# Patient Record
Sex: MALE | Race: WHITE | NOT HISPANIC OR LATINO | URBAN - METROPOLITAN AREA
[De-identification: names, ages, dates, MRNs, and addresses within clinical notes are randomized per-mention and may not be internally consistent; named-entity substitution may affect disease eponyms.]

---

## 2017-05-16 ENCOUNTER — EMERGENCY (EMERGENCY)
Facility: HOSPITAL | Age: 62
LOS: 1 days | Discharge: HOME | End: 2017-05-16
Attending: EMERGENCY MEDICINE | Admitting: EMERGENCY MEDICINE
Payer: MEDICARE

## 2017-05-16 VITALS — HEIGHT: 73.5 IN | WEIGHT: 172.18 LBS

## 2017-05-16 VITALS
RESPIRATION RATE: 18 BRPM | HEART RATE: 73 BPM | SYSTOLIC BLOOD PRESSURE: 149 MMHG | TEMPERATURE: 98 F | OXYGEN SATURATION: 97 % | DIASTOLIC BLOOD PRESSURE: 81 MMHG

## 2017-05-16 DIAGNOSIS — R45.851 SUICIDAL IDEATIONS: ICD-10-CM

## 2017-05-16 DIAGNOSIS — Z86.73 PERSONAL HISTORY OF TRANSIENT ISCHEMIC ATTACK (TIA), AND CEREBRAL INFARCTION WITHOUT RESIDUAL DEFICITS: ICD-10-CM

## 2017-05-16 DIAGNOSIS — K21.9 GASTRO-ESOPHAGEAL REFLUX DISEASE WITHOUT ESOPHAGITIS: ICD-10-CM

## 2017-05-16 DIAGNOSIS — Z88.8 ALLERGY STATUS TO OTHER DRUGS, MEDICAMENTS AND BIOLOGICAL SUBSTANCES: ICD-10-CM

## 2017-05-16 DIAGNOSIS — Z88.2 ALLERGY STATUS TO SULFONAMIDES: ICD-10-CM

## 2017-05-16 DIAGNOSIS — Z88.5 ALLERGY STATUS TO NARCOTIC AGENT: ICD-10-CM

## 2017-05-16 DIAGNOSIS — F41.9 ANXIETY DISORDER, UNSPECIFIED: ICD-10-CM

## 2017-05-16 DIAGNOSIS — F32.9 MAJOR DEPRESSIVE DISORDER, SINGLE EPISODE, UNSPECIFIED: ICD-10-CM

## 2017-05-16 DIAGNOSIS — F17.200 NICOTINE DEPENDENCE, UNSPECIFIED, UNCOMPLICATED: ICD-10-CM

## 2017-05-16 DIAGNOSIS — I25.10 ATHEROSCLEROTIC HEART DISEASE OF NATIVE CORONARY ARTERY WITHOUT ANGINA PECTORIS: ICD-10-CM

## 2017-05-16 DIAGNOSIS — J44.9 CHRONIC OBSTRUCTIVE PULMONARY DISEASE, UNSPECIFIED: ICD-10-CM

## 2017-05-16 LAB
ALBUMIN SERPL ELPH-MCNC: 4.2 G/DL — SIGNIFICANT CHANGE UP (ref 3.3–5)
ALP SERPL-CCNC: 65 U/L — SIGNIFICANT CHANGE UP (ref 40–120)
ALT FLD-CCNC: 15 U/L RC — SIGNIFICANT CHANGE UP (ref 10–45)
ANION GAP SERPL CALC-SCNC: 16 MMOL/L — SIGNIFICANT CHANGE UP (ref 5–17)
AST SERPL-CCNC: 21 U/L — SIGNIFICANT CHANGE UP (ref 10–40)
BASOPHILS # BLD AUTO: 0 K/UL — SIGNIFICANT CHANGE UP (ref 0–0.2)
BASOPHILS NFR BLD AUTO: 0.4 % — SIGNIFICANT CHANGE UP (ref 0–2)
BILIRUB SERPL-MCNC: 0.7 MG/DL — SIGNIFICANT CHANGE UP (ref 0.2–1.2)
BUN SERPL-MCNC: 15 MG/DL — SIGNIFICANT CHANGE UP (ref 7–23)
CALCIUM SERPL-MCNC: 9.3 MG/DL — SIGNIFICANT CHANGE UP (ref 8.4–10.5)
CHLORIDE SERPL-SCNC: 100 MMOL/L — SIGNIFICANT CHANGE UP (ref 96–108)
CO2 SERPL-SCNC: 25 MMOL/L — SIGNIFICANT CHANGE UP (ref 22–31)
CREAT SERPL-MCNC: 1.03 MG/DL — SIGNIFICANT CHANGE UP (ref 0.5–1.3)
EOSINOPHIL # BLD AUTO: 0.1 K/UL — SIGNIFICANT CHANGE UP (ref 0–0.5)
EOSINOPHIL NFR BLD AUTO: 0.6 % — SIGNIFICANT CHANGE UP (ref 0–6)
ETHANOL SERPL-MCNC: SIGNIFICANT CHANGE UP MG/DL (ref 0–10)
GLUCOSE SERPL-MCNC: 117 MG/DL — HIGH (ref 70–99)
HCT VFR BLD CALC: 44.5 % — SIGNIFICANT CHANGE UP (ref 39–50)
HGB BLD-MCNC: 14.9 G/DL — SIGNIFICANT CHANGE UP (ref 13–17)
LYMPHOCYTES # BLD AUTO: 2.3 K/UL — SIGNIFICANT CHANGE UP (ref 1–3.3)
LYMPHOCYTES # BLD AUTO: 21.2 % — SIGNIFICANT CHANGE UP (ref 13–44)
MCHC RBC-ENTMCNC: 32.9 PG — SIGNIFICANT CHANGE UP (ref 27–34)
MCHC RBC-ENTMCNC: 33.5 GM/DL — SIGNIFICANT CHANGE UP (ref 32–36)
MCV RBC AUTO: 98.2 FL — SIGNIFICANT CHANGE UP (ref 80–100)
MONOCYTES # BLD AUTO: 0.4 K/UL — SIGNIFICANT CHANGE UP (ref 0–0.9)
MONOCYTES NFR BLD AUTO: 3.6 % — SIGNIFICANT CHANGE UP (ref 2–14)
NEUTROPHILS # BLD AUTO: 8.1 K/UL — HIGH (ref 1.8–7.4)
NEUTROPHILS NFR BLD AUTO: 74.2 % — SIGNIFICANT CHANGE UP (ref 43–77)
PCP SPEC-MCNC: SIGNIFICANT CHANGE UP
PLATELET # BLD AUTO: 303 K/UL — SIGNIFICANT CHANGE UP (ref 150–400)
POTASSIUM SERPL-MCNC: 4.5 MMOL/L — SIGNIFICANT CHANGE UP (ref 3.5–5.3)
POTASSIUM SERPL-SCNC: 4.5 MMOL/L — SIGNIFICANT CHANGE UP (ref 3.5–5.3)
PROT SERPL-MCNC: 7.7 G/DL — SIGNIFICANT CHANGE UP (ref 6–8.3)
RBC # BLD: 4.53 M/UL — SIGNIFICANT CHANGE UP (ref 4.2–5.8)
RBC # FLD: 12.3 % — SIGNIFICANT CHANGE UP (ref 10.3–14.5)
SODIUM SERPL-SCNC: 141 MMOL/L — SIGNIFICANT CHANGE UP (ref 135–145)
TSH SERPL-MCNC: 0.74 UIU/ML — SIGNIFICANT CHANGE UP (ref 0.27–4.2)
WBC # BLD: 10.9 K/UL — HIGH (ref 3.8–10.5)
WBC # FLD AUTO: 10.9 K/UL — HIGH (ref 3.8–10.5)

## 2017-05-16 PROCEDURE — 84443 ASSAY THYROID STIM HORMONE: CPT

## 2017-05-16 PROCEDURE — 85027 COMPLETE CBC AUTOMATED: CPT

## 2017-05-16 PROCEDURE — 80053 COMPREHEN METABOLIC PANEL: CPT

## 2017-05-16 PROCEDURE — 90792 PSYCH DIAG EVAL W/MED SRVCS: CPT | Mod: GC

## 2017-05-16 PROCEDURE — 99284 EMERGENCY DEPT VISIT MOD MDM: CPT | Mod: GC

## 2017-05-16 PROCEDURE — 80307 DRUG TEST PRSMV CHEM ANLYZR: CPT

## 2017-05-16 PROCEDURE — 99285 EMERGENCY DEPT VISIT HI MDM: CPT

## 2017-05-16 NOTE — ED PROVIDER NOTE - OBJECTIVE STATEMENT
61M current smoker hx COPD, CAD, CVA, anxiety BIBEMS for SI.  As per EMS, patient reported to internist Dr. Yayo Lopez that he wanted to kill himself.  Expressed an active plan to shoot himself with a licensed weapon he owns and keeps at home. 61M current smoker hx COPD, CAD, CVA, anxiety, chronic pain following with pain management ([facility]) BIBEMS for SI.  As per EMS, patient reported to internist Dr. Yayo Lopez that he wanted to kill himself.  Expressed an active plan to shoot himself with a licensed weapon he owns and keeps at home.  Had previously been prescribed cymbalta, wellbutrin with adverse reactions.  Just self-d/c'd depakote.  Currently taking lexapro just recently increased and neurontin.  Valium and hydrocodone-acetaminophen for pain. 61M current smoker hx COPD, CAD, CVA, anxiety, chronic pain following with pain management ([facility] Sandoval) BIBEMS for SI.  As per EMS, patient reported to internist Dr. Yayo Lopez that he wanted to kill himself.  Expressed an active plan to shoot himself with a licensed weapon he owns and keeps at home.  Had previously been prescribed cymbalta, wellbutrin with adverse reactions.  Just self-d/c'd depakote.  Currently taking lexapro just recently increased and neurontin.  Valium and hydrocodone-acetaminophen for pain.    Collateral information from patient's wife who presented to ED shortly after patient presentation.

## 2017-05-16 NOTE — ED PROVIDER NOTE - ATTENDING CONTRIBUTION TO CARE
Dr. Irene (Attending Physician)  I performed a history and physical exam of the patient and discussed their management with the resident. I reviewed the resident's note and agree with the documented findings and plan of care. My medical decision making and observations are found above.

## 2017-05-16 NOTE — ED BEHAVIORAL HEALTH NOTE - BEHAVIORAL HEALTH NOTE
Pt refused EKG despite encouragement and education, Pt stated he's had enough EKGs and has been normal. MD made aware

## 2017-05-16 NOTE — ED PROVIDER NOTE - PMH
Anxiety    COPD (chronic obstructive pulmonary disease)    Coronary artery disease    CVA (cerebral vascular accident)    GERD (gastroesophageal reflux disease)

## 2017-05-16 NOTE — ED ADULT NURSE NOTE - OBJECTIVE STATEMENT
60 y/o male BIB EMS.Pt went to his internist today asking for pain medications. During the visit pt verbalized that he feels suicidal and had a plan to kill himself with a  gun that he owns at home, doctors office in turn called 911..Pt appeared anxious and persisitent, oppositional, needing much encouragement  to comply with some instructions.  Pt also c/o pain after having three surgeries, pt last surgery was in december and now pt c/o "constant pain". Pt uses cane and states that he can lose his balance at anytime. Upon GF's arrival to the hospital, pt immediately told her not disclose any information and told her if she does, he witl stop speaking to her.. GF took all of patients properties, pt was placed on 1:1 for safety 62 y/o male BIB EMS.Pt went to his internist today asking for pain medications. During the visit pt verbalized that he feels suicidal and had a plan to kill himself with a  gun that he owns at home, doctors office in turn called 911..Pt appeared anxious and persistent, oppositional, needing much encouragement  to comply with some instructions.  Pt also c/o pain after having three surgeries due to hernia, pt last surgery was in december and now pt c/o "constant pain". Pt had a stroke wich was just confirmed  about three weeks ago. Pt uses cane and states that he can lose his balance at anytime.. GF took all of patients properties, pt was placed on 1:1 for safety 60 y/o male BIB EMS.Pt went to his internist today asking for pain medications. During the visit pt verbalized that he feels suicidal and had a plan to kill himself with a gun that he owns at home, doctors office in turn called 911, upon interview with patient, pt denied same and said he was misunderstood by the doctor. ..Pt appeared anxious and persistent, oppositional, needing much encouragement  to comply with some instructions.  Pt also c/o pain after having three surgeries due to hernia, Pt last surgery was in december and now pt c/o "constant pain". Pt had a stroke wich was just confirmed  about three weeks ago. Pt uses cane and states that he can lose his balance at anytime.. GF took all of patients properties, pt was placed on 1:1 for safety

## 2017-05-16 NOTE — ED BEHAVIORAL HEALTH ASSESSMENT NOTE - SUMMARY
62 y/o male with anxiety and depression secondary to medical issues sent by PMD for evaluation of suicidality. Patient is calm, cooperative, alert and orientedx4. Denies any acute depressive symptoms and denies any suicidal ideation, intent or plan. No homicidality or hallucinations.  Patient does not meet criteria for inpatient psychiatric admission.  Interested in psych follow-up at [facility], info provided.  Discussed with Dr. Swift 62 y/o male with anxiety and depression secondary to medical issues sent by PMD for evaluation of suicidality. Patient is calm, cooperative, alert and orientedx4. Denies any acute depressive symptoms and denies any suicidal ideation, intent or plan. No homicidality or hallucinations.  Patient does not meet criteria for inpatient psychiatric admission.  Interested in psych follow-up at [facility], info provided.  Discussed and seen with Dr. Swift

## 2017-05-16 NOTE — ED BEHAVIORAL HEALTH ASSESSMENT NOTE - CASE SUMMARY
This is a 61-y.o. male patient with anxiety and depression due to chronic medical issues sent by PMD for evaluation of suicidality. Patient endorses some anxiety and some mood sx, but adamantly denies hopelessness, helplessness, SI/HI. Girlfriend confirms patient is not and has not been suicidal. He is likely not an imminent threat to self or others at this time and can be discharged at this time to follow up with an outpatient provider.

## 2017-05-16 NOTE — ED BEHAVIORAL HEALTH ASSESSMENT NOTE - SUICIDE PROTECTIVE FACTORS
Fear of death or dying due to pain/suffering/Ability to cope with stress/Positive therapeutic relationships/Supportive social network or family/Identifies reasons for living/Future oriented/Responsibility to family and others

## 2017-05-16 NOTE — ED PROVIDER NOTE - CARE PLAN
Principal Discharge DX:	Suicidal ideation Principal Discharge DX:	Suicidal ideation  Instructions for follow-up, activity and diet:	Please follow-up with psychiatry as instructed.  Take home medications as prescribed by your primary doctor.  Return for any new/worsening symptoms or concerns.

## 2017-05-16 NOTE — ED BEHAVIORAL HEALTH ASSESSMENT NOTE - RISK ASSESSMENT
Patient is a white male, has medical co-morbidities and chronic pain, on psych meds prescribed by PMD, He has access to firearm. He has some reactive depression and anxiety in context of ongoing medical issues, pending surgeries and chronic pain.   He has no psychiatric illness prior to 2 yrs when his medical issues started. He has no h/o suicidal ideation, intent, plan or attempt. He lives at home with girlfriend for 3 yrs, firearm not at home. Girlfriend has no safety concerns, he has never expressed any suicidality to her. He is future oriented and wishes to follow-up with a psychiatrist.

## 2017-05-16 NOTE — ED BEHAVIORAL HEALTH ASSESSMENT NOTE - HPI (INCLUDE ILLNESS QUALITY, SEVERITY, DURATION, TIMING, CONTEXT, MODIFYING FACTORS, ASSOCIATED SIGNS AND SYMPTOMS)
60 y/o male with multiple medical co-morbidities brought in after he was referred by PMD for reportedly expressing suicidality. It was documented in ER note that he expressed a desire to shoot himself with a gun.  Patient reports he was seeing his PMD and told him he is frustrated with all his medical issues and that "this is not living". He said he was just venting and at no point made any suicidal statements or said anything about shooting himself. He does own a gun which he keeps locked.   Patient reports ongoing anxiety and depressed mood about his medical issues due to multiple hernia surgeries, COPD, Cardiac problems and a recent stroke which has affected his short term memory. But denies any hopelessness, helplessness, guilt, worthlessness or suicidal thoughts. Denies any intent or plan to hurt himself.

## 2017-05-16 NOTE — ED BEHAVIORAL HEALTH ASSESSMENT NOTE - DESCRIPTION
Patient has no prior psychiatric history. He started becoming anxious after first having hernia surgery 2 years ago. He was in a lot of pain and needed more surgeries. Since then, he has been on various psychiatric meds prescribed by PMD. Currently on Lexapro 10 mg and Valium 10 mg, thinks it's working ok. He sleeps inconsistently but it is due to pain, he cannot lie flat and has to sleep on recliner.  No h/o any psychiatric issues or treatment in the past. No h/o janet or psychosis.  No h/o any suicidality or psych hospitalizations.   Smokes a pack and half of cigarettes a day. No alcohol or any other drugs use.  H/O CVA, COPD, hernia  No family h/o mental illness.  Patient used to work as a , went on disability 2 years ago due to surgeries and pain. Lives at home with girlfriend.   Girlfriend Estela in ED reports that she has known him for 40 yrs and living with him for 3 yrs. He has been upset and frustrated due to medical issues, which she believes are not being addressed properly by PMD, also he has had side-effects to some medications. So he becomes irritable but at no point has expressed any suicidal thoughts. He always takes care of his medical issues, goes to appointments and takes medications. No h/o any suicidality in the past. CVA, COPD, Hernia see above

## 2017-05-16 NOTE — ED PROVIDER NOTE - MEDICAL DECISION MAKING DETAILS
Dr. Irene (Attending Physician)  Suicidal ideation with plan to shoot himself with gun.  On valium and opioids at home but Denies recent drug or etoh abuse. Physical exam unremarkable. Normal gait. Heart and lungs clear. No concerns for active intoxication or medical cause of suicidal ideation. Will check labs and consult psych. medically cleared.

## 2017-05-16 NOTE — ED PROVIDER NOTE - PLAN OF CARE
Please follow-up with psychiatry as instructed.  Take home medications as prescribed by your primary doctor.  Return for any new/worsening symptoms or concerns.

## 2017-11-03 ENCOUNTER — EMERGENCY (EMERGENCY)
Facility: HOSPITAL | Age: 62
LOS: 0 days | Discharge: HOME | End: 2017-11-04
Attending: EMERGENCY MEDICINE
Payer: MEDICARE

## 2017-11-03 VITALS
HEART RATE: 74 BPM | RESPIRATION RATE: 18 BRPM | DIASTOLIC BLOOD PRESSURE: 54 MMHG | TEMPERATURE: 98 F | HEIGHT: 73 IN | OXYGEN SATURATION: 92 % | WEIGHT: 171.96 LBS | SYSTOLIC BLOOD PRESSURE: 136 MMHG

## 2017-11-03 PROCEDURE — 99284 EMERGENCY DEPT VISIT MOD MDM: CPT

## 2017-11-03 PROCEDURE — 73130 X-RAY EXAM OF HAND: CPT | Mod: 26,LT

## 2017-11-03 PROCEDURE — 73610 X-RAY EXAM OF ANKLE: CPT | Mod: 26,RT

## 2017-11-03 RX ORDER — IBUPROFEN 200 MG
600 TABLET ORAL ONCE
Qty: 0 | Refills: 0 | Status: DISCONTINUED | OUTPATIENT
Start: 2017-11-03 | End: 2017-11-04
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
## 2017-11-03 NOTE — ED ADULT NURSE NOTE - PMH
Anxiety    Chronic back pain    COPD (chronic obstructive pulmonary disease)    Coronary artery disease    CVA (cerebral vascular accident)    GERD (gastroesophageal reflux disease)
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
## 2017-11-04 VITALS
OXYGEN SATURATION: 94 % | RESPIRATION RATE: 16 BRPM | DIASTOLIC BLOOD PRESSURE: 56 MMHG | HEART RATE: 60 BPM | TEMPERATURE: 98 F | SYSTOLIC BLOOD PRESSURE: 124 MMHG

## 2017-11-04 PROCEDURE — 72125 CT NECK SPINE W/O DYE: CPT | Mod: 26

## 2017-11-04 PROCEDURE — 70450 CT HEAD/BRAIN W/O DYE: CPT | Mod: 26

## 2017-11-04 PROCEDURE — 71250 CT THORAX DX C-: CPT | Mod: 26

## 2017-11-04 RX ORDER — MORPHINE SULFATE 50 MG/1
2 CAPSULE, EXTENDED RELEASE ORAL ONCE
Qty: 0 | Refills: 0 | Status: DISCONTINUED | OUTPATIENT
Start: 2017-11-04 | End: 2017-11-04

## 2017-11-04 RX ORDER — ACETAMINOPHEN 500 MG
975 TABLET ORAL ONCE
Qty: 0 | Refills: 0 | Status: COMPLETED | OUTPATIENT
Start: 2017-11-04 | End: 2017-11-04

## 2017-11-04 RX ADMIN — Medication 2 MILLIGRAM(S): at 01:30

## 2017-11-04 NOTE — ED ADULT NURSE REASSESSMENT NOTE - NS ED NURSE REASSESS COMMENT FT1
Patient asleep at this time, family at bedside, no signs of distress noted. Will continue to monitor

## 2017-11-06 DIAGNOSIS — F41.9 ANXIETY DISORDER, UNSPECIFIED: ICD-10-CM

## 2017-11-06 DIAGNOSIS — M79.1 MYALGIA: ICD-10-CM

## 2017-11-06 DIAGNOSIS — I25.10 ATHEROSCLEROTIC HEART DISEASE OF NATIVE CORONARY ARTERY WITHOUT ANGINA PECTORIS: ICD-10-CM

## 2017-11-06 DIAGNOSIS — S90.511A ABRASION, RIGHT ANKLE, INITIAL ENCOUNTER: ICD-10-CM

## 2017-11-06 DIAGNOSIS — Z86.73 PERSONAL HISTORY OF TRANSIENT ISCHEMIC ATTACK (TIA), AND CEREBRAL INFARCTION WITHOUT RESIDUAL DEFICITS: ICD-10-CM

## 2017-11-06 DIAGNOSIS — Z04.1 ENCOUNTER FOR EXAMINATION AND OBSERVATION FOLLOWING TRANSPORT ACCIDENT: ICD-10-CM
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
## 2018-01-10 NOTE — ED ADULT NURSE NOTE - NS ED NURSE DC PT EDUCATION RESOURCES
Home health notifying doctor:    Patient declined home physical therapy evaluation .  She said she did not need it .    Thanks,    Landen GRAY  [phone]     Yes
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
## 2021-01-04 NOTE — ED BEHAVIORAL HEALTH ASSESSMENT NOTE - ABNORMAL MOVEMENTS
Airway  Performed by: Julianne Bolton MD  Authorized by: Julianne Bolton MD     Final Airway Type:  Mask   Patient Identified, Procedure confirmed, Emergency equipment available and Safety protocols followed  Location:  OR  Indications for Airway Management:  Anesthesia  Spontaneous Ventilation: present    Sedation Level:  Deep  Mask Difficulty Assessment:  0 - not attempted  Performed By:  Anesthesiologist  Anesthesiologist:  Julianne Bolton MD  Start Time:  1/4/2021 9:48 AM         No abnormal movements

## 2021-04-28 NOTE — ED PROVIDER NOTE - LOWER EXTREMITY EXAM, RIGHT
superficial abrasion, right medial ankle pain family in private home several stairs to enter, and bedroom and bathroom 1 flight up.

## 2022-08-01 PROBLEM — J44.9 CHRONIC OBSTRUCTIVE PULMONARY DISEASE, UNSPECIFIED: Chronic | Status: ACTIVE | Noted: 2017-05-16

## 2022-08-01 PROBLEM — I63.9 CEREBRAL INFARCTION, UNSPECIFIED: Chronic | Status: ACTIVE | Noted: 2017-05-16

## 2022-08-01 PROBLEM — F41.9 ANXIETY DISORDER, UNSPECIFIED: Chronic | Status: ACTIVE | Noted: 2017-05-16

## 2022-08-01 PROBLEM — K21.9 GASTRO-ESOPHAGEAL REFLUX DISEASE WITHOUT ESOPHAGITIS: Chronic | Status: ACTIVE | Noted: 2017-05-16

## 2022-08-01 PROBLEM — I25.10 ATHEROSCLEROTIC HEART DISEASE OF NATIVE CORONARY ARTERY WITHOUT ANGINA PECTORIS: Chronic | Status: ACTIVE | Noted: 2017-05-16

## 2022-08-16 ENCOUNTER — APPOINTMENT (OUTPATIENT)
Dept: UROLOGY | Facility: CLINIC | Age: 67
End: 2022-08-16

## 2022-09-14 ENCOUNTER — LABORATORY RESULT (OUTPATIENT)
Age: 67
End: 2022-09-14

## 2022-09-14 ENCOUNTER — APPOINTMENT (OUTPATIENT)
Dept: UROLOGY | Facility: CLINIC | Age: 67
End: 2022-09-14

## 2022-09-14 VITALS
HEIGHT: 73 IN | OXYGEN SATURATION: 97 % | DIASTOLIC BLOOD PRESSURE: 82 MMHG | TEMPERATURE: 98.3 F | HEART RATE: 71 BPM | BODY MASS INDEX: 22.8 KG/M2 | WEIGHT: 172 LBS | SYSTOLIC BLOOD PRESSURE: 139 MMHG

## 2022-09-14 DIAGNOSIS — Z86.69 PERSONAL HISTORY OF OTHER DISEASES OF THE NERVOUS SYSTEM AND SENSE ORGANS: ICD-10-CM

## 2022-09-14 DIAGNOSIS — Z63.5 DISRUPTION OF FAMILY BY SEPARATION AND DIVORCE: ICD-10-CM

## 2022-09-14 DIAGNOSIS — Z87.19 PERSONAL HISTORY OF OTHER DISEASES OF THE DIGESTIVE SYSTEM: ICD-10-CM

## 2022-09-14 DIAGNOSIS — N39.0 URINARY TRACT INFECTION, SITE NOT SPECIFIED: ICD-10-CM

## 2022-09-14 PROCEDURE — 51798 US URINE CAPACITY MEASURE: CPT

## 2022-09-14 PROCEDURE — 99205 OFFICE O/P NEW HI 60 MIN: CPT

## 2022-09-14 RX ORDER — GABAPENTIN 600 MG/1
600 TABLET, COATED ORAL
Refills: 0 | Status: ACTIVE | COMMUNITY

## 2022-09-14 RX ORDER — HYDROCODONE BITARTRATE AND ACETAMINOPHEN 5; 300 MG/1; MG/1
TABLET ORAL
Refills: 0 | Status: ACTIVE | COMMUNITY

## 2022-09-14 SDOH — SOCIAL STABILITY - SOCIAL INSECURITY: DISRUPTION OF FAMILY BY SEPARATION AND DIVORCE: Z63.5

## 2022-09-14 NOTE — PHYSICAL EXAM
[Urethral Meatus] : meatus normal [Urinary Bladder Findings] : the bladder was normal on palpation [Scrotum] : the scrotum was normal [Testes Mass (___cm)] : there were no testicular masses [No Prostate Nodules] : no prostate nodules [General Appearance - Well Developed] : well developed [General Appearance - Well Nourished] : well nourished [Normal Appearance] : normal appearance [Well Groomed] : well groomed [General Appearance - In No Acute Distress] : no acute distress [Edema] : no peripheral edema [Respiration, Rhythm And Depth] : normal respiratory rhythm and effort [Exaggerated Use Of Accessory Muscles For Inspiration] : no accessory muscle use [Abdomen Soft] : soft [Abdomen Tenderness] : non-tender [Costovertebral Angle Tenderness] : no ~M costovertebral angle tenderness [Penis Abnormality] : normal circumcised penis [Epididymis] : the epididymides were normal [Testes Tenderness] : no tenderness of the testes [Anus Abnormality] : the anus and perineum were normal [Rectal Exam - Rectum] : digital rectal exam was normal [Prostate Tenderness] : the prostate was not tender [Prostate Size ___ (0-4)] : prostate size [unfilled] (scale: 0-4) [Normal Station and Gait] : the gait and station were normal for the patient's age [] : no rash [No Focal Deficits] : no focal deficits [Oriented To Time, Place, And Person] : oriented to person, place, and time [Affect] : the affect was normal [Mood] : the mood was normal [Not Anxious] : not anxious [No Palpable Adenopathy] : no palpable adenopathy

## 2022-09-14 NOTE — HISTORY OF PRESENT ILLNESS
[FreeTextEntry1] : 09/14/2022: Mr. ARRIOLA is a 67 year male who presents today for "UTI". Stating he has hesitancy, burning, painful, and frequent urination. Symptoms have been going on for 2 weeks. N x 2 Day time 2 hr. PVR: 43\par UA and Culture today. \par \par In June he felt the same symptoms and went to Urgent Care and was told he had a UTI. Was given ABX Keflex for 2 weeks. Relieved. .\par \par Hernia surgery in March, 2022 at [facility]. \par \par O/E: circumcised phallus, adequate meatus, both testes descended, nontender, no mass palpable\par KAYLEY: non tender prostate.\par \par will get UA, culture. ill prescribe antibiotics as per culture. \par \par RTC:  3 weeks

## 2022-09-15 ENCOUNTER — NON-APPOINTMENT (OUTPATIENT)
Age: 67
End: 2022-09-15

## 2022-09-15 LAB
APPEARANCE: CLEAR
BILIRUBIN URINE: NEGATIVE
BLOOD URINE: NEGATIVE
COLOR: NORMAL
GLUCOSE QUALITATIVE U: NEGATIVE
KETONES URINE: NEGATIVE
LEUKOCYTE ESTERASE URINE: ABNORMAL
NITRITE URINE: NEGATIVE
PH URINE: 6.5
PROTEIN URINE: NEGATIVE
SPECIFIC GRAVITY URINE: 1.01
UROBILINOGEN URINE: NORMAL

## 2022-09-29 ENCOUNTER — NON-APPOINTMENT (OUTPATIENT)
Age: 67
End: 2022-09-29

## 2022-10-04 DIAGNOSIS — N40.1 BENIGN PROSTATIC HYPERPLASIA WITH LOWER URINARY TRACT SYMPMS: ICD-10-CM

## 2022-10-27 ENCOUNTER — APPOINTMENT (OUTPATIENT)
Dept: UROLOGY | Facility: CLINIC | Age: 67
End: 2022-10-27

## 2024-12-02 ENCOUNTER — APPOINTMENT (OUTPATIENT)
Dept: OTOLARYNGOLOGY | Facility: CLINIC | Age: 69
End: 2024-12-02